# Patient Record
Sex: MALE | Race: WHITE | NOT HISPANIC OR LATINO | Employment: FULL TIME | URBAN - METROPOLITAN AREA
[De-identification: names, ages, dates, MRNs, and addresses within clinical notes are randomized per-mention and may not be internally consistent; named-entity substitution may affect disease eponyms.]

---

## 2017-01-31 ENCOUNTER — HOSPITAL ENCOUNTER (OUTPATIENT)
Facility: MEDICAL CENTER | Age: 27
End: 2017-01-31
Attending: ORTHOPAEDIC SURGERY | Admitting: ORTHOPAEDIC SURGERY
Payer: MEDICAID

## 2017-01-31 ENCOUNTER — APPOINTMENT (OUTPATIENT)
Dept: RADIOLOGY | Facility: MEDICAL CENTER | Age: 27
End: 2017-01-31
Attending: ORTHOPAEDIC SURGERY
Payer: MEDICAID

## 2017-01-31 VITALS
WEIGHT: 175 LBS | BODY MASS INDEX: 23.7 KG/M2 | RESPIRATION RATE: 16 BRPM | HEART RATE: 96 BPM | HEIGHT: 72 IN | TEMPERATURE: 97.9 F | OXYGEN SATURATION: 98 %

## 2017-01-31 PROBLEM — S62.022A: Status: ACTIVE | Noted: 2017-01-31

## 2017-01-31 PROCEDURE — 700111 HCHG RX REV CODE 636 W/ 250 OVERRIDE (IP)

## 2017-01-31 PROCEDURE — 501838 HCHG SUTURE GENERAL: Performed by: ORTHOPAEDIC SURGERY

## 2017-01-31 PROCEDURE — A6222 GAUZE <=16 IN NO W/SAL W/O B: HCPCS | Performed by: ORTHOPAEDIC SURGERY

## 2017-01-31 PROCEDURE — 160048 HCHG OR STATISTICAL LEVEL 1-5: Performed by: ORTHOPAEDIC SURGERY

## 2017-01-31 PROCEDURE — 110382 HCHG SHELL REV 271: Performed by: ORTHOPAEDIC SURGERY

## 2017-01-31 PROCEDURE — 160002 HCHG RECOVERY MINUTES (STAT): Performed by: ORTHOPAEDIC SURGERY

## 2017-01-31 PROCEDURE — 700102 HCHG RX REV CODE 250 W/ 637 OVERRIDE(OP): Performed by: ORTHOPAEDIC SURGERY

## 2017-01-31 PROCEDURE — 502576 HCHG PACK, HAND: Performed by: ORTHOPAEDIC SURGERY

## 2017-01-31 PROCEDURE — 700102 HCHG RX REV CODE 250 W/ 637 OVERRIDE(OP)

## 2017-01-31 PROCEDURE — 501480 HCHG STOCKINETTE: Performed by: ORTHOPAEDIC SURGERY

## 2017-01-31 PROCEDURE — 160046 HCHG PACU - 1ST 60 MINS PHASE II: Performed by: ORTHOPAEDIC SURGERY

## 2017-01-31 PROCEDURE — A9270 NON-COVERED ITEM OR SERVICE: HCPCS

## 2017-01-31 PROCEDURE — 302135 SEQUENTIAL COMPRESSION MACHINE: Performed by: ORTHOPAEDIC SURGERY

## 2017-01-31 PROCEDURE — A9270 NON-COVERED ITEM OR SERVICE: HCPCS | Performed by: ORTHOPAEDIC SURGERY

## 2017-01-31 PROCEDURE — A4606 OXYGEN PROBE USED W OXIMETER: HCPCS | Performed by: ORTHOPAEDIC SURGERY

## 2017-01-31 PROCEDURE — 110371 HCHG SHELL REV 272: Performed by: ORTHOPAEDIC SURGERY

## 2017-01-31 PROCEDURE — 160039 HCHG SURGERY MINUTES - EA ADDL 1 MIN LEVEL 3: Performed by: ORTHOPAEDIC SURGERY

## 2017-01-31 PROCEDURE — 160028 HCHG SURGERY MINUTES - 1ST 30 MINS LEVEL 3: Performed by: ORTHOPAEDIC SURGERY

## 2017-01-31 PROCEDURE — 160009 HCHG ANES TIME/MIN: Performed by: ORTHOPAEDIC SURGERY

## 2017-01-31 PROCEDURE — 700101 HCHG RX REV CODE 250

## 2017-01-31 PROCEDURE — 502000 HCHG MISC OR IMPLANTS RC 0278: Performed by: ORTHOPAEDIC SURGERY

## 2017-01-31 PROCEDURE — 160025 RECOVERY II MINUTES (STATS): Performed by: ORTHOPAEDIC SURGERY

## 2017-01-31 PROCEDURE — 501736 HCHG WIRE, K-5M DBL END: Performed by: ORTHOPAEDIC SURGERY

## 2017-01-31 PROCEDURE — 160047 HCHG PACU  - EA ADDL 30 MINS PHASE II: Performed by: ORTHOPAEDIC SURGERY

## 2017-01-31 PROCEDURE — 700111 HCHG RX REV CODE 636 W/ 250 OVERRIDE (IP): Performed by: ORTHOPAEDIC SURGERY

## 2017-01-31 PROCEDURE — 502240 HCHG MISC OR SUPPLY RC 0272: Performed by: ORTHOPAEDIC SURGERY

## 2017-01-31 PROCEDURE — 160036 HCHG PACU - EA ADDL 30 MINS PHASE I: Performed by: ORTHOPAEDIC SURGERY

## 2017-01-31 PROCEDURE — 160035 HCHG PACU - 1ST 60 MINS PHASE I: Performed by: ORTHOPAEDIC SURGERY

## 2017-01-31 DEVICE — WIRE K- SMOOTH .045 - (3TX6=18): Type: IMPLANTABLE DEVICE | Status: FUNCTIONAL

## 2017-01-31 DEVICE — IMPLANTABLE DEVICE: Type: IMPLANTABLE DEVICE | Status: FUNCTIONAL

## 2017-01-31 RX ORDER — DIPHENHYDRAMINE HYDROCHLORIDE 50 MG/ML
25 INJECTION INTRAMUSCULAR; INTRAVENOUS EVERY 6 HOURS PRN
Status: DISCONTINUED | OUTPATIENT
Start: 2017-01-31 | End: 2017-01-31 | Stop reason: HOSPADM

## 2017-01-31 RX ORDER — IMIPRAMINE HYDROCHLORIDE 10 MG/1
10 TABLET, FILM COATED ORAL NIGHTLY
COMMUNITY
End: 2018-12-11

## 2017-01-31 RX ORDER — CLONIDINE HYDROCHLORIDE 0.1 MG/1
0.1 TABLET ORAL
COMMUNITY
End: 2018-12-11

## 2017-01-31 RX ORDER — OXYCODONE HYDROCHLORIDE 10 MG/1
10 TABLET ORAL
Status: DISCONTINUED | OUTPATIENT
Start: 2017-01-31 | End: 2017-01-31 | Stop reason: HOSPADM

## 2017-01-31 RX ORDER — DEXAMETHASONE SODIUM PHOSPHATE 4 MG/ML
4 INJECTION, SOLUTION INTRA-ARTICULAR; INTRALESIONAL; INTRAMUSCULAR; INTRAVENOUS; SOFT TISSUE
Status: DISCONTINUED | OUTPATIENT
Start: 2017-01-31 | End: 2017-01-31 | Stop reason: HOSPADM

## 2017-01-31 RX ORDER — SODIUM CHLORIDE, SODIUM LACTATE, POTASSIUM CHLORIDE, CALCIUM CHLORIDE 600; 310; 30; 20 MG/100ML; MG/100ML; MG/100ML; MG/100ML
1000 INJECTION, SOLUTION INTRAVENOUS
Status: DISCONTINUED | OUTPATIENT
Start: 2017-01-31 | End: 2017-01-31 | Stop reason: HOSPADM

## 2017-01-31 RX ORDER — HALOPERIDOL 5 MG/ML
1 INJECTION INTRAMUSCULAR EVERY 6 HOURS PRN
Status: DISCONTINUED | OUTPATIENT
Start: 2017-01-31 | End: 2017-01-31 | Stop reason: HOSPADM

## 2017-01-31 RX ORDER — OXYCODONE HYDROCHLORIDE AND ACETAMINOPHEN 5; 325 MG/1; MG/1
TABLET ORAL
Status: COMPLETED
Start: 2017-01-31 | End: 2017-01-31

## 2017-01-31 RX ORDER — ONDANSETRON 2 MG/ML
4 INJECTION INTRAMUSCULAR; INTRAVENOUS EVERY 4 HOURS PRN
Status: DISCONTINUED | OUTPATIENT
Start: 2017-01-31 | End: 2017-01-31 | Stop reason: HOSPADM

## 2017-01-31 RX ORDER — BUPIVACAINE HYDROCHLORIDE 5 MG/ML
INJECTION, SOLUTION EPIDURAL; INTRACAUDAL
Status: DISCONTINUED | OUTPATIENT
Start: 2017-01-31 | End: 2017-01-31 | Stop reason: HOSPADM

## 2017-01-31 RX ADMIN — FENTANYL CITRATE 25 MCG: 50 INJECTION, SOLUTION INTRAMUSCULAR; INTRAVENOUS at 17:45

## 2017-01-31 RX ADMIN — FENTANYL CITRATE 25 MCG: 50 INJECTION, SOLUTION INTRAMUSCULAR; INTRAVENOUS at 17:30

## 2017-01-31 RX ADMIN — FENTANYL CITRATE 25 MCG: 50 INJECTION, SOLUTION INTRAMUSCULAR; INTRAVENOUS at 18:00

## 2017-01-31 RX ADMIN — FENTANYL CITRATE 25 MCG: 50 INJECTION, SOLUTION INTRAMUSCULAR; INTRAVENOUS at 17:35

## 2017-01-31 RX ADMIN — OXYCODONE HYDROCHLORIDE AND ACETAMINOPHEN 2 TABLET: 5; 325 TABLET ORAL at 17:45

## 2017-01-31 RX ADMIN — HYDROMORPHONE HYDROCHLORIDE 0.2 MG: 1 INJECTION, SOLUTION INTRAMUSCULAR; INTRAVENOUS; SUBCUTANEOUS at 17:35

## 2017-01-31 ASSESSMENT — PAIN SCALES - GENERAL
PAINLEVEL_OUTOF10: 10
PAINLEVEL_OUTOF10: 8
PAINLEVEL_OUTOF10: 10
PAINLEVEL_OUTOF10: 6
PAINLEVEL_OUTOF10: 5
PAINLEVEL_OUTOF10: 8
PAINLEVEL_OUTOF10: 0
PAINLEVEL_OUTOF10: 5

## 2017-01-31 NOTE — IP AVS SNAPSHOT
1/31/2017          Irving Alaniz  [address] [place] [address]    Dear Irving:    [facility] wants to ensure your discharge home is safe and you or your loved ones have had all your questions answered regarding your care after you leave the hospital.    You may receive a telephone call within two days of your discharge.  This call is to make certain you understand your discharge instructions as well as ensure we provided you with the best care possible during your stay with us.     The call will only last approximately 3-5 minutes and will be done by a nurse.    Once again, we want to ensure your discharge home is safe and that you have a clear understanding of any next steps in your care.  If you have any questions or concerns, please do not hesitate to contact us, we are here for you.  Thank you for choosing [facility] for your healthcare needs.    Sincerely,    Salbador Messer    [facility]

## 2017-01-31 NOTE — OR SURGEON
Immediate Post-Operative Note      PreOp Diagnosis: L scaphoid waist fracture    PostOp Diagnosis: same    Procedure(s):  WRIST ORIF - SCAPHOID left    Surgeon(s):  Jeff Campos M.D.    Anesthesiologist/Type of Anesthesia:  Anesthesiologist: Amari Valerio D.O./* No anesthesia type entered *    Surgical Staff:  Circulator: Alis Norton R.N.  Scrub Person: Evaristo Judd    Specimen: none    Estimated Blood Loss: min    Findings: comminuted fracture    Complications: none        1/31/2017 3:47 PM Jeff Campos

## 2017-01-31 NOTE — IP AVS SNAPSHOT
After Visit Summary                                                                                                                Name:Irving Alaniz  Medical Record Number:[id number]  CSN: [id number]    YOB: 1990   Age: 26 y.o.  Sex: male  HT:1.829 m (6') WT: 79.379 kg (175 lb)          Admit Date: 1/31/2017     Discharge Date:   Today's Date: 1/31/2017  Attending Doctor:  Jeff Campos M.D.                  Allergies:  Review of patient's allergies indicates not on file.              Follow-up Information     1. Follow up with Jeff Campos M.D..    Specialty:  Orthopaedics    Why:  10-14 days    Contact information    [address]  [address]  [facility] [address]  [phone]          Discharge Instructions         ACTIVITY: Rest and take it easy for the first 24 hours.  A responsible adult is recommended to remain with you during that time.  It is normal to feel sleepy.  We encourage you to not do anything that requires balance, judgment or coordination.    MILD FLU-LIKE SYMPTOMS ARE NORMAL. YOU MAY EXPERIENCE GENERALIZED MUSCLE ACHES, THROAT IRRITATION, HEADACHE AND/OR SOME NAUSEA.    FOR 24 HOURS DO NOT:  Drive, operate machinery or run household appliances.  Drink beer or alcoholic beverages.   Make important decisions or sign legal documents.    SPECIAL INSTRUCTIONS: move all fingers frequently.  Non weight bearing to Left hand.  No lifting ,pulling, or pushing using the left hand.  Keep hand elevated often  And apply ice frequently.    DIET: To avoid nausea, slowly advance diet as tolerated, avoiding spicy or greasy foods for the first day.  Add more substantial food to your diet according to your physician's instructions.  Babies can be fed formula or breast milk as soon as they are hungry.  INCREASE FLUIDS AND FIBER TO AVOID CONSTIPATION.    SURGICAL DRESSING/BATHING: Keep dressing clean and dry, may shower on post op day #1 with dressings covered.  Leave dressing in place until follow up  visit.    FOLLOW-UP APPOINTMENT:  A follow-up appointment should be arranged with your doctor ; call to schedule.    You should CALL YOUR PHYSICIAN if you develop:  Fever greater than 101 degrees F.  Pain not relieved by medication, or persistent nausea or vomiting.  Excessive bleeding (blood soaking through dressing) or unexpected drainage from the wound.  Extreme redness or swelling around the incision site, drainage of pus or foul smelling drainage.  Inability to urinate or empty your bladder within 8 hours.  Problems with breathing or chest pain.    You should call 911 if you develop problems with breathing or chest pain.  If you are unable to contact your doctor or surgical center, you should go to the nearest emergency room or urgent care center.  Physician's telephone #: [phone]    If any questions arise, call your doctor.  If your doctor is not available, please feel free to call the Surgical Center at {Surgical Dept Numbers:[id number]}.  The Center is open Monday through Friday from 7AM to 7PM.  You can also call the [facility] HOTLINE open 24 hours/day, 7 days/week and speak to a nurse at [phone], or toll free at [phone].    A registered nurse may call you a few days after your surgery to see how you are doing after your procedure.    MEDICATIONS: Resume taking daily medication.  Take prescribed pain medication with food.  If no medication is prescribed, you may take non-aspirin pain medication if needed.  PAIN MEDICATION CAN BE VERY CONSTIPATING.  Take a stool softener or laxative such as senokot, pericolace, or milk of magnesia if needed.    Prescription given for Oxycodone.  Last pain medication given at 5:35pm.    If your physician has prescribed pain medication that includes Acetaminophen (Tylenol), do not take additional Acetaminophen (Tylenol) while taking the prescribed medication.    Depression / Suicide Risk    As you are discharged from this [facility] facility, it is important to  learn how to keep safe from harming yourself.    Recognize the warning signs:  · Abrupt changes in personality, positive or negative- including increase in energy   · Giving away possessions  · Change in eating patterns- significant weight changes-  positive or negative  · Change in sleeping patterns- unable to sleep or sleeping all the time   · Unwillingness or inability to communicate  · Depression  · Unusual sadness, discouragement and loneliness  · Talk of wanting to die  · Neglect of personal appearance   · Rebelliousness- reckless behavior  · Withdrawal from people/activities they love  · Confusion- inability to concentrate     If you or a loved one observes any of these behaviors or has concerns about self-harm, here's what you can do:  · Talk about it- your feelings and reasons for harming yourself  · Remove any means that you might use to hurt yourself (examples: pills, rope, extension cords, firearm)  · Get professional help from the community (Mental Health, Substance Abuse, psychological counseling)  · Do not be alone:Call your Safe Contact- someone whom you trust who will be there for you.  · Call your local CRISIS HOTLINE 854-0542 or 060-384-2004  · Call your local Children's Mobile Crisis Response Team [place] (434) 957-3292 or www.[employer]  · Call the toll free National Suicide Prevention Hotlines   · National Suicide Prevention Lifeline 999-653-BEFG (7410)  · National Hope Line Network 800-SUICIDE (463-1757)       Medication List      CONTINUE taking these medications        Instructions    clonidine 0.1 MG Tabs   Commonly known as:  CATAPRES    Take 0.1 mg by mouth Once PRN.   Dose:  0.1 mg       imipramine 10 MG Tabs   Commonly known as:  TOFRANIL    Take 10 mg by mouth every evening.   Dose:  10 mg               Medication Information     Above and/or attached are the medications your physician expects you to take upon discharge. Review all of your home medications and newly ordered  medications with your doctor and/or pharmacist. Follow medication instructions as directed by your doctor and/or pharmacist. Please keep your medication list with you and share with your physician. Update the information when medications are discontinued, doses are changed, or new medications (including over-the-counter products) are added; and carry medication information at all times in the event of emergency situations.        Resources     Quit Smoking / Tobacco Use:    I understand the use of any tobacco products increases my chance of suffering from future heart disease or stroke and could cause other illnesses which may shorten my life. Quitting the use of tobacco products is the single most important thing I can do to improve my health. For further information on smoking / tobacco cessation call a Toll Free Quit Line at 1-996.912.9576 (*National Cancer Amery) or 1-453.862.2157 (American Lung Association) or you can access the web based program at www.lungusa.org.    [place] Tobacco Users Help Line:  (530) 833-3860       Toll Free: 1-669.252.1914  Quit Tobacco Program [facility] (433)395-3514    Crisis Hotline:    [place] Crisis Hotline:  0-867-EHNRZOA or 1-995.181.5049    [place] Crisis Hotline:    1-727.540.9825 or 801-791-1415    Discharge Survey:   Thank you for choosing [facility]. We hope we did everything we could to make your hospital stay a pleasant one. You may be receiving a survey and we would appreciate your time and participation in answering the questions. Your input is very valuable to us in our efforts to improve our service to our patients and their families.            Signatures     My signature on this form indicates that:    1. I acknowledge receipt and understanding of these Home Care Instruction.  2. My questions regarding this information have been answered to my satisfaction.  3. I have formulated a plan with my discharge nurse to obtain my prescribed  medications for home.    __________________________________      __________________________________                   Patient Signature                                 Guardian/Responsible Adult Signature      __________________________________                 __________       ________                       Nurse Signature                                               Date                 Time

## 2017-02-01 NOTE — OR NURSING
1707-arrives PACU. Sleeping, unresponsive to verbal or tactile stim.  ETT in place with OA. O2 @ 10L NC via ETT. VSS.  L wrist elevated on pillows and ice pack applied.  + warm pink digits noted.    1720-pt spontaneously arouses, OETT d/cd pt tolerated well. O2 via NC @2L.  Pt c/o 10/10 pain to wrist pt medicated per MD orders.  IVFs infusing w/o infiltrate.  dsg dry and intact.  1745-pt tolerates sips of water w/o nausea. Pt medicated orally. Vss. Friend updated by phone.    1815-pt sleeping intermittently. Arouses to verbal stim. vss pt admits some relief of pain but still intolerable at present.  Pt remedicated per MD orders see MAR.    1830-pt arouses to verbal stim. States pain is tolerable at present.  Pt assisted to dress and into recliner chair.  Friend to chairside.  1900-pt tolerates po flds w/o n/v. vss d/c instructions to friend with good understanding.  Pt denies urge to void at present.  D/cd via wc.

## 2017-02-01 NOTE — DISCHARGE INSTRUCTIONS
ACTIVITY: Rest and take it easy for the first 24 hours.  A responsible adult is recommended to remain with you during that time.  It is normal to feel sleepy.  We encourage you to not do anything that requires balance, judgment or coordination.    MILD FLU-LIKE SYMPTOMS ARE NORMAL. YOU MAY EXPERIENCE GENERALIZED MUSCLE ACHES, THROAT IRRITATION, HEADACHE AND/OR SOME NAUSEA.    FOR 24 HOURS DO NOT:  Drive, operate machinery or run household appliances.  Drink beer or alcoholic beverages.   Make important decisions or sign legal documents.    SPECIAL INSTRUCTIONS: move all fingers frequently.  Non weight bearing to Left hand.  No lifting ,pulling, or pushing using the left hand.  Keep hand elevated often  And apply ice frequently.    DIET: To avoid nausea, slowly advance diet as tolerated, avoiding spicy or greasy foods for the first day.  Add more substantial food to your diet according to your physician's instructions.  Babies can be fed formula or breast milk as soon as they are hungry.  INCREASE FLUIDS AND FIBER TO AVOID CONSTIPATION.    SURGICAL DRESSING/BATHING: Keep dressing clean and dry, may shower on post op day #1 with dressings covered.  Leave dressing in place until follow up visit.    FOLLOW-UP APPOINTMENT:  A follow-up appointment should be arranged with your doctor ; call to schedule.    You should CALL YOUR PHYSICIAN if you develop:  Fever greater than 101 degrees F.  Pain not relieved by medication, or persistent nausea or vomiting.  Excessive bleeding (blood soaking through dressing) or unexpected drainage from the wound.  Extreme redness or swelling around the incision site, drainage of pus or foul smelling drainage.  Inability to urinate or empty your bladder within 8 hours.  Problems with breathing or chest pain.    You should call 911 if you develop problems with breathing or chest pain.  If you are unable to contact your doctor or surgical center, you should go to the nearest emergency room or  urgent care center.  Physician's telephone #: [phone]    If any questions arise, call your doctor.  If your doctor is not available, please feel free to call the Surgical Center at {Surgical Dept Numbers:[id number]}.  The Center is open Monday through Friday from 7AM to 7PM.  You can also call the HEALTH HOTLINE open 24 hours/day, 7 days/week and speak to a nurse at [phone], or toll free at [phone].    A registered nurse may call you a few days after your surgery to see how you are doing after your procedure.    MEDICATIONS: Resume taking daily medication.  Take prescribed pain medication with food.  If no medication is prescribed, you may take non-aspirin pain medication if needed.  PAIN MEDICATION CAN BE VERY CONSTIPATING.  Take a stool softener or laxative such as senokot, pericolace, or milk of magnesia if needed.    Prescription given for Oxycodone.  Last pain medication given at 5:35pm.    If your physician has prescribed pain medication that includes Acetaminophen (Tylenol), do not take additional Acetaminophen (Tylenol) while taking the prescribed medication.    Depression / Suicide Risk    As you are discharged from this [facility] facility, it is important to learn how to keep safe from harming yourself.    Recognize the warning signs:  · Abrupt changes in personality, positive or negative- including increase in energy   · Giving away possessions  · Change in eating patterns- significant weight changes-  positive or negative  · Change in sleeping patterns- unable to sleep or sleeping all the time   · Unwillingness or inability to communicate  · Depression  · Unusual sadness, discouragement and loneliness  · Talk of wanting to die  · Neglect of personal appearance   · Rebelliousness- reckless behavior  · Withdrawal from people/activities they love  · Confusion- inability to concentrate     If you or a loved one observes any of these behaviors or has concerns about self-harm, here's what you  can do:  · Talk about it- your feelings and reasons for harming yourself  · Remove any means that you might use to hurt yourself (examples: pills, rope, extension cords, firearm)  · Get professional help from the community (Mental Health, Substance Abuse, psychological counseling)  · Do not be alone:Call your Safe Contact- someone whom you trust who will be there for you.  · Call your local CRISIS HOTLINE 560-8126 or 061-164-3176  · Call your local Children's Mobile Crisis Response Team [place] (719) 482-3690 or www.[employer]  · Call the toll free National Suicide Prevention Hotlines   · National Suicide Prevention Lifeline 558-389-YBCG (6882)  · National Hope Line Network 800-SUICIDE (966-6008)

## 2017-02-01 NOTE — OP REPORT
DATE OF SERVICE:  01/31/2017    PREOPERATIVE DIAGNOSIS:  Left comminuted scaphoid fracture.    POSTOPERATIVE DIAGNOSIS:  Left comminuted scaphoid fracture.    PROCEDURES:  1.  ORIF left comminuted scaphoid fracture.  2.  Bone graft, left scaphoid.    SURGEON:  Jeff Campos MD    ANESTHESIA:  General.    ESTIMATED BLOOD LOSS:  Minimal.    DRAINS:  None.    COMPLICATIONS:  None.    INDICATIONS:  The patient is a gentleman snowboarder sustained a scaphoid   fracture, felt to benefit from ORIF.  I explained the risks, benefits,   complications, and alternatives to the patient.  All questions were answered.    No guarantees were given.  Signed consent was obtained.    DESCRIPTION OF PROCEDURE:  The patient was taken to operating room and laid   supine on the table.  All bony prominences well padded.  Good general was   obtained without difficulty.  Left upper extremity was prepped and draped in   the usual sterile fashion using a ChloraPrep.  Limb was exsanguinated with   elevation, tourniquet raised, total tourniquet time was just about an hour.  I   made a volar incision hockey shaped over the scaphoid through skin and   subcutaneous tissues, retracted the FCR tendon, did a capsulotomy.  Open the   joint to evaluate the fracture.  There was a flipped comminuted.  There were   multiple comminuted fractures at the site.  I subsequently removed the soft   tissue from within the joint.  Under fluoroscopy, it looked like he had   already got some resorption of the defect.  Subsequently we went dorsally,   made an incision over Carmina's tubercle, elevated and harvested some bone   graft from the distal radius.  Once I did this, I closed the fascia with   Vicryl, subQ with Vicryl, skin with nylon.  I went back to the volar side,   irrigated and packed the bone graft within the defect, replaced the comminuted   fragments, flipped back and then reduced.  I then provisionally pinned the   scaphoid with a 0.045 K wire.  Then,  using the TriMed scaphoid screw system, I   placed a screw down the center portion of the scaphoid, reviewed under   multiple films under fluoroscopy, found to be in good position.  I then   measured, drilled a 22 mm long thread, TriMed scaphoid screw was then placed.    There was really good compression across the fracture site.  To help as a   second point of derotation, left 0.045 K wire was bent and impacted into   place.  I left it, twisted on the volar side, so would not irritate the   scaphotrapezium joint.  The construct was viewed under fluoroscopy, appeared   to be a reasonable reduction of the articular surface.  Screw was in good   position, it moved as one unit.  The wound was irrigated.  I closed the deep   fascia under Vicryl, subQ fascia over the FCR with Vicryl, subQ with Vicryl,   skin with nylon.  The dorsal wound was already closed.  Local without   epinephrine was injected.  Sterile dressing of Xeroform, 4x4, Sof-Rol, and   thumb spica splint was applied.  All needle and sponge counts were correct.    The patient tolerated the procedure well and was transferred to recovery in   stable condition.       ____________________________________     MD AUREA WALLER / MATTHEW    DD:  01/31/2017 17:10:30  DT:  01/31/2017 19:58:01    D#:  [id number]  Job#:  [id number]

## 2018-10-29 ENCOUNTER — OFFICE VISIT (OUTPATIENT)
Dept: MEDICAL GROUP | Facility: MEDICAL CENTER | Age: 28
End: 2018-10-29
Attending: FAMILY MEDICINE
Payer: COMMERCIAL

## 2018-10-29 VITALS
HEIGHT: 73 IN | TEMPERATURE: 98.5 F | SYSTOLIC BLOOD PRESSURE: 118 MMHG | BODY MASS INDEX: 23.35 KG/M2 | RESPIRATION RATE: 16 BRPM | WEIGHT: 176.2 LBS | OXYGEN SATURATION: 95 % | HEART RATE: 72 BPM | DIASTOLIC BLOOD PRESSURE: 60 MMHG

## 2018-10-29 DIAGNOSIS — R53.83 FATIGUE, UNSPECIFIED TYPE: ICD-10-CM

## 2018-10-29 DIAGNOSIS — Z00.00 HEALTHCARE MAINTENANCE: ICD-10-CM

## 2018-10-29 DIAGNOSIS — F41.9 ANXIETY: ICD-10-CM

## 2018-10-29 PROCEDURE — 99203 OFFICE O/P NEW LOW 30 MIN: CPT | Performed by: FAMILY MEDICINE

## 2018-10-29 PROCEDURE — 99204 OFFICE O/P NEW MOD 45 MIN: CPT | Performed by: FAMILY MEDICINE

## 2018-10-29 RX ORDER — HYDROXYZINE 50 MG/1
50 TABLET, FILM COATED ORAL 3 TIMES DAILY PRN
Qty: 30 TAB | Refills: 0 | Status: SHIPPED | OUTPATIENT
Start: 2018-10-29 | End: 2019-06-11

## 2018-10-29 RX ORDER — BUPROPION HYDROCHLORIDE 150 MG/1
150 TABLET ORAL EVERY MORNING
Qty: 30 TAB | Refills: 6 | Status: SHIPPED | OUTPATIENT
Start: 2018-10-29 | End: 2018-10-29 | Stop reason: SDUPTHER

## 2018-10-29 RX ORDER — BUPROPION HYDROCHLORIDE 150 MG/1
150 TABLET ORAL EVERY MORNING
Qty: 30 TAB | Refills: 6 | Status: SHIPPED | OUTPATIENT
Start: 2018-10-29 | End: 2018-12-11

## 2018-10-29 ASSESSMENT — PATIENT HEALTH QUESTIONNAIRE - PHQ9: CLINICAL INTERPRETATION OF PHQ2 SCORE: 0

## 2018-10-30 NOTE — PROGRESS NOTES
Chief Complaint   Patient presents with   • Establish Care   • Anxiety         HISTORY OF THE PRESENT ILLNESS: Patient is a 27 y.o. male. This pleasant patient is here today to establish care and discuss the following problems      Anxiety  The patient presents today with a history of being started at age 7 on clonidine and imipramine for depression/anxiety.  Patient reports he continued on this medication continuously over nearly the past 20 years.  About 7-8 months ago patient tapered off of both of those medications because he found that he was having extreme difficulty getting up to attend his employment, which required him to get up at about 3:30 AM each day.  Patient reports he was able to get up more successfully early each morning.  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  Patient reports he is never been dismissed from a position before.  He had worked there for 18 months.  Since they were short handed he had worked the previous 34 days consistently without a day off.  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  He reports that he was having difficulty with his memory due to prolonged lack of days off.  Patient reports that occasionally since he was fired he has had some difficulty with his orientation finding his way back home after going out for a jog in his neighborhood as an example.  He denies suicidal ideation, blurred vision, altered hearing.  Patient reports he has increased anxiety since he was dismissed from his job and has been  unable to get himself in for several different job interviews due to high levels of anxiety    Fatigue  Patient reports a general sense of fatigue over particularly the past month.  He is  not reporting any hair loss or cold intolerance.  Patient does report that he has been told he snores loudly but is not certain whether he experiences apneic spells.  Patient himself does not recall awakening short of breath in the middle of the night.    Social history-single, currently unemployed  Allergies: Patient has no known allergies.    Current Outpatient Prescriptions Ordered in [facility]   Medication Sig Dispense Refill   • hydrOXYzine HCl (ATARAX) 50 MG Tab Take 1 Tab by mouth 3 times a day as needed for Itching. 30 Tab 0   • buPROPion (WELLBUTRIN XL) 150 MG XL tablet Take 1 Tab by mouth every morning. 30 Tab 6   • clonidine (CATAPRES) 0.1 MG Tab Take 0.1 mg by mouth Once PRN.     • imipramine (TOFRANIL) 10 MG Tab Take 10 mg by mouth every evening.       No current Epic-ordered facility-administered medications on file.        Past Medical History:   Diagnosis Date   • Anxiety 2018       Past Surgical History:   Procedure Laterality Date   • WRIST ORIF Left 1/31/2017    Procedure: WRIST ORIF - SCAPHOID;  Surgeon: Jeff Campos M.D.;  Location: SURGERY [facility];  Service:        Social History   Substance Use Topics   • Smoking status: Never Smoker   • Smokeless tobacco: Never Used   • Alcohol use Yes      Comment: occassionally       Family Status   Relation Status   • MGFa (Not Specified)   • Neg Hx (Not Specified)     Family History   Problem Relation Age of Onset   • Cancer Maternal Grandfather         lung   • Diabetes Neg Hx    • Heart Disease Neg Hx    • Stroke Neg Hx        Review of Systems   Constitutional: Negative for fever, chills, weight loss and malaise/fatigue.   HENT: Negative for ear pain, nosebleeds, congestion, sore throat and neck pain.    Eyes: Negative for blurred vision.   Respiratory: Negative for cough, sputum production, shortness of breath and wheezing.    Cardiovascular: Negative for chest pain, palpitations, orthopnea and leg swelling.   Gastrointestinal: Negative for  "heartburn, nausea, vomiting and abdominal pain.   Genitourinary: Negative for dysuria, urgency and frequency.   Musculoskeletal: Negative for myalgias, back pain and joint pain.   Skin: Negative for rash and itching.   Neurological: Negative for dizziness, tingling, tremors, sensory change, focal weakness and headaches.   Endo/Heme/Allergies: Does not bruise/bleed easily.   Psychiatric/Behavioral: Positive for depression, anxiety, and occasional memory loss        Exam: Blood pressure 118/60, pulse 72, temperature 36.9 °C (98.5 °F), temperature source Temporal, resp. rate 16, height 1.848 m (6' 0.75\"), weight 79.9 kg (176 lb 3.2 oz), SpO2 95 %.  General: Normal appearing. No distress.  HEENT: Normocephalic. Eyes conjunctiva clear lids without ptosis, pupils equal and reactive to light accommodation, ears normal shape and contour, canals are clear bilaterally, tympanic membranes are benign, nasal mucosa benign, oropharynx is without erythema, edema or exudates.   Neck: Supple without JVD or bruit. Thyroid is not enlarged.  Pulmonary: Clear to ausculation.  Normal effort. No rales, ronchi, or wheezing.  Cardiovascular: Regular rate and rhythm without murmur. Carotid and radial pulses are intact and equal bilaterally.  Abdomen: Soft, nontender, nondistended. Normal bowel sounds. Liver and spleen are not palpable  Neurologic: Intact light touch and strength bilaterally,normal speech, no tremor, normal gait.   Lymph: No cervical, supraclavicular or axillary lymph nodes are palpable  Skin: Warm and dry.  No obvious lesions.  Musculoskeletal: Normal gait. No extremity cyanosis, clubbing, or edema.  Psych: Normal mood and affect. Alert and oriented x3. Judgment and insight is normal.    Please note that this dictation was created using voice recognition software. I have made every reasonable attempt to correct obvious errors, but I expect that there are errors of grammar and possibly content that I did not discover before " finalizing the note.      Assessment/Plan  1. Anxiety  COMP METABOLIC PANEL    CBC WITH DIFFERENTIAL    REFERRAL TO PSYCHOLOGY   2. Fatigue, unspecified type  TSH   3. Healthcare maintenance  LIPID PROFILE     Plan: 1.  Obtain CMP, CBC, fasting lipids, TSH  2.  Referral to psychology-anxiety and depression  3.  Trial of bupropion  mg every morning  4.  Hydroxyzine 50 mg every 6 hours as needed severe anxiety episodes  5.  Follow-up with me in 3 weeks

## 2018-10-30 NOTE — ASSESSMENT & PLAN NOTE
The patient presents today with a history of being started at age 7 on clonidine and imipramine for depression/anxiety.  Patient reports he continued on this medication continuously over nearly the past 20 years.  About 7-8 months ago patient tapered off of both of those medications because he found that he was having extreme difficulty getting up to attend his employment, which required him to get up at about 3:30 AM each day.  Patient reports he was able to get up more successfully early each morning.  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  Patient reports he is never been dismissed from a position before.  He had worked there for 18 months.  Since they were short handed he had worked the previous 34 days consistently without a day off.  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  He reports that he was having difficulty with his memory due to prolonged lack of days off.  Patient reports that occasionally since he was fired he has had some difficulty with his orientation finding his way back home after going out for a jog in his neighborhood as an example.  He denies suicidal ideation, blurred vision, altered hearing

## 2018-10-30 NOTE — ASSESSMENT & PLAN NOTE
Patient reports a general sense of fatigue over particularly the past month.  He is not reporting any hair loss or cold intolerance.  Patient does report that he has been told he snores loudly but is not certain whether he experiences apneic spells.  Patient himself does not recall awakening short of breath in the middle of the night.

## 2018-11-05 ENCOUNTER — HOSPITAL ENCOUNTER (OUTPATIENT)
Dept: LAB | Facility: MEDICAL CENTER | Age: 28
End: 2018-11-05
Attending: FAMILY MEDICINE
Payer: COMMERCIAL

## 2018-11-05 DIAGNOSIS — R53.83 FATIGUE, UNSPECIFIED TYPE: ICD-10-CM

## 2018-11-05 DIAGNOSIS — F41.9 ANXIETY: ICD-10-CM

## 2018-11-05 DIAGNOSIS — Z00.00 HEALTHCARE MAINTENANCE: ICD-10-CM

## 2018-11-05 LAB
BASOPHILS # BLD AUTO: 0.8 % (ref 0–1.8)
BASOPHILS # BLD: 0.06 K/UL (ref 0–0.12)
EOSINOPHIL # BLD AUTO: 0.06 K/UL (ref 0–0.51)
EOSINOPHIL NFR BLD: 0.8 % (ref 0–6.9)
ERYTHROCYTE [DISTWIDTH] IN BLOOD BY AUTOMATED COUNT: 41.4 FL (ref 35.9–50)
HCT VFR BLD AUTO: 45.9 % (ref 42–52)
HGB BLD-MCNC: 15.1 G/DL (ref 14–18)
IMM GRANULOCYTES # BLD AUTO: 0.02 K/UL (ref 0–0.11)
IMM GRANULOCYTES NFR BLD AUTO: 0.3 % (ref 0–0.9)
LYMPHOCYTES # BLD AUTO: 2.39 K/UL (ref 1–4.8)
LYMPHOCYTES NFR BLD: 31.6 % (ref 22–41)
MCH RBC QN AUTO: 29.7 PG (ref 27–33)
MCHC RBC AUTO-ENTMCNC: 32.9 G/DL (ref 33.7–35.3)
MCV RBC AUTO: 90.2 FL (ref 81.4–97.8)
MONOCYTES # BLD AUTO: 0.48 K/UL (ref 0–0.85)
MONOCYTES NFR BLD AUTO: 6.3 % (ref 0–13.4)
NEUTROPHILS # BLD AUTO: 4.55 K/UL (ref 1.82–7.42)
NEUTROPHILS NFR BLD: 60.2 % (ref 44–72)
NRBC # BLD AUTO: 0 K/UL
NRBC BLD-RTO: 0 /100 WBC
PLATELET # BLD AUTO: 299 K/UL (ref 164–446)
PMV BLD AUTO: 10.4 FL (ref 9–12.9)
RBC # BLD AUTO: 5.09 M/UL (ref 4.7–6.1)
TSH SERPL DL<=0.005 MIU/L-ACNC: 1.56 UIU/ML (ref 0.38–5.33)
WBC # BLD AUTO: 7.6 K/UL (ref 4.8–10.8)

## 2018-11-05 PROCEDURE — 36415 COLL VENOUS BLD VENIPUNCTURE: CPT

## 2018-11-05 PROCEDURE — 80053 COMPREHEN METABOLIC PANEL: CPT

## 2018-11-05 PROCEDURE — 80061 LIPID PANEL: CPT

## 2018-11-05 PROCEDURE — 84443 ASSAY THYROID STIM HORMONE: CPT

## 2018-11-05 PROCEDURE — 85025 COMPLETE CBC W/AUTO DIFF WBC: CPT

## 2018-11-06 LAB
ALBUMIN SERPL BCP-MCNC: 4.8 G/DL (ref 3.2–4.9)
ALBUMIN/GLOB SERPL: 1.7 G/DL
ALP SERPL-CCNC: 33 U/L (ref 30–99)
ALT SERPL-CCNC: 23 U/L (ref 2–50)
ANION GAP SERPL CALC-SCNC: 8 MMOL/L (ref 0–11.9)
AST SERPL-CCNC: 18 U/L (ref 12–45)
BILIRUB SERPL-MCNC: 1.1 MG/DL (ref 0.1–1.5)
BUN SERPL-MCNC: 13 MG/DL (ref 8–22)
CALCIUM SERPL-MCNC: 10.5 MG/DL (ref 8.5–10.5)
CHLORIDE SERPL-SCNC: 103 MMOL/L (ref 96–112)
CHOLEST SERPL-MCNC: 189 MG/DL (ref 100–199)
CO2 SERPL-SCNC: 27 MMOL/L (ref 20–33)
CREAT SERPL-MCNC: 1.11 MG/DL (ref 0.5–1.4)
FASTING STATUS PATIENT QL REPORTED: NORMAL
GLOBULIN SER CALC-MCNC: 2.8 G/DL (ref 1.9–3.5)
GLUCOSE SERPL-MCNC: 86 MG/DL (ref 65–99)
HDLC SERPL-MCNC: 51 MG/DL
LDLC SERPL CALC-MCNC: 123 MG/DL
POTASSIUM SERPL-SCNC: 4.2 MMOL/L (ref 3.6–5.5)
PROT SERPL-MCNC: 7.6 G/DL (ref 6–8.2)
SODIUM SERPL-SCNC: 138 MMOL/L (ref 135–145)
TRIGL SERPL-MCNC: 75 MG/DL (ref 0–149)

## 2018-11-19 ENCOUNTER — OFFICE VISIT (OUTPATIENT)
Dept: MEDICAL GROUP | Facility: MEDICAL CENTER | Age: 28
End: 2018-11-19
Attending: FAMILY MEDICINE
Payer: COMMERCIAL

## 2018-11-19 VITALS
HEART RATE: 72 BPM | HEIGHT: 73 IN | SYSTOLIC BLOOD PRESSURE: 108 MMHG | TEMPERATURE: 98.5 F | WEIGHT: 175 LBS | BODY MASS INDEX: 23.19 KG/M2 | RESPIRATION RATE: 16 BRPM | DIASTOLIC BLOOD PRESSURE: 60 MMHG | OXYGEN SATURATION: 97 %

## 2018-11-19 DIAGNOSIS — F41.9 ANXIETY: ICD-10-CM

## 2018-11-19 PROCEDURE — 99213 OFFICE O/P EST LOW 20 MIN: CPT | Performed by: FAMILY MEDICINE

## 2018-11-19 NOTE — PROGRESS NOTES
"Subjective:      Irving Alaniz is a 27 y.o. male who presents with Anxiety            HPI1. Anxiety- notes mod decrease in anxiety, less racing thoughts. Sleep is fair, 8hrs. No opportunity to take any hydroxyzine so far.  Patient denies suicidal ideation or confusion.    ROS negative for chest pain, chest pressure, dyspnea       Objective:     /60 (BP Location: Left arm, Patient Position: Sitting, BP Cuff Size: Adult)   Pulse 72   Temp 36.9 °C (98.5 °F) (Temporal)   Resp 16   Ht 1.848 m (6' 0.76\")   Wt 79.4 kg (175 lb)   SpO2 97%   BMI 23.24 kg/m²      Physical Exam  Gen.- alert, cooperative, in no acute distress  Neck- midline trachea, thyroid not enlarged or tender,supple, no cervical adenopathy  Chest-clear to auscultation and percussion with normal breath sounds. No retractions. Chest wall nontender  Cardiac- regular rhythm and rate. No murmur, thrill, or heave  Psych-normal affect with good eye contact. Normal grooming. Oriented x4.            Assessment/Plan:     1. Anxiety      Plan: 1.  Patient is encouraged to go ahead and complete the psychology referral process to initiate an appointment  2.  Continue current dose of Wellbutrin 150 mg XL every morning  3.  Revisit with me in additional 3-4 weeks.  "

## 2018-11-20 ENCOUNTER — TELEPHONE (OUTPATIENT)
Dept: MEDICAL GROUP | Facility: MEDICAL CENTER | Age: 28
End: 2018-11-20

## 2018-11-20 NOTE — TELEPHONE ENCOUNTER
1. Name: Irving      Call Back Number: [phone]  Patient approves a detailed voicemail message: yes    2. [place] [employer] unemployment paperwork received from Irving requiring provider signature. Patient stated that he forgot to bring in the paperwork to his appt that he had with his PCP on Nov 19th.    3. Paperwork placed in MA folder/basket to process.

## 2018-12-11 ENCOUNTER — OFFICE VISIT (OUTPATIENT)
Dept: MEDICAL GROUP | Facility: MEDICAL CENTER | Age: 28
End: 2018-12-11
Attending: FAMILY MEDICINE
Payer: COMMERCIAL

## 2018-12-11 VITALS
BODY MASS INDEX: 23.86 KG/M2 | RESPIRATION RATE: 16 BRPM | WEIGHT: 180 LBS | OXYGEN SATURATION: 96 % | DIASTOLIC BLOOD PRESSURE: 82 MMHG | SYSTOLIC BLOOD PRESSURE: 122 MMHG | TEMPERATURE: 98.1 F | HEIGHT: 73 IN | HEART RATE: 80 BPM

## 2018-12-11 DIAGNOSIS — F33.1 MODERATE EPISODE OF RECURRENT MAJOR DEPRESSIVE DISORDER (HCC): ICD-10-CM

## 2018-12-11 PROCEDURE — 99213 OFFICE O/P EST LOW 20 MIN: CPT | Performed by: FAMILY MEDICINE

## 2018-12-11 RX ORDER — BUPROPION HYDROCHLORIDE 300 MG/1
300 TABLET ORAL EVERY MORNING
Qty: 30 TAB | Refills: 6 | Status: SHIPPED | OUTPATIENT
Start: 2018-12-11 | End: 2019-01-10

## 2018-12-11 ASSESSMENT — PAIN SCALES - GENERAL: PAINLEVEL: NO PAIN

## 2018-12-11 NOTE — PROGRESS NOTES
"Subjective:      Irving Alaniz is a 27 y.o. male who presents with Follow-Up            HPI 1.  Anxiety/depression-patient reports that he has had no significant anxiety recently.  Has not had any panic episodes that would prompt him to take a hydroxyzine tablet.  He has been compliant taking bupropion 150 mg XL.  Notes that some days, more often afternoon he just lacks energy and initiative and is somewhat hesitant about trying to start work.  Other days he reports feeling pretty much his normal self.  Is actively exercising which gives him a sense of well-being.  Is being social with friends which also is positive for his internal well-being.  Patient shared that both his parents are taking and have done well on Zoloft for mild to moderate depression.    ROS negative for hallucinations, confusion, tinnitus       Objective:     /82 (BP Location: Left arm, Patient Position: Sitting, BP Cuff Size: Adult)   Pulse 80   Temp 36.7 °C (98.1 °F) (Temporal)   Resp 16   Ht 1.848 m (6' 0.76\")   Wt 81.6 kg (180 lb)   SpO2 96%   BMI 23.91 kg/m²      Physical Exam  Gen.- alert, cooperative, in no acute distress  Neck- midline trachea, thyroid not enlarged or tender,supple, no cervical adenopathy  Chest-clear to auscultation and percussion with normal breath sounds. No retractions. Chest wall nontender  Cardiac- regular rhythm and rate. No murmur, thrill, or heave  Psych-normal affect with good eye contact. Normal grooming. Oriented x4.            Assessment/Plan:     1. Moderate episode of recurrent major depressive disorder (HCC)      Plan: 1.  After discussion we will increase Wellbutrin to 300 mg once daily with consideration to switching to Zoloft in the future if needed.  Washout interval was reviewed  2.  Patient has just started counseling with his first appointment yesterday next appointment in 1 week  "

## 2018-12-11 NOTE — LETTER
[facility], [facility]  Cornell Peralta M.D.  [address]  [place] [address]  Fax: [phone]   Authorization for Release/Disclosure of   Protected Health Information   Name: LONG MONTES : 1990 SSN: [id number]   [place] [facility] [address] Phone:    [phone] (home)    I authorize the entity listed below to release/disclose the PHI below to:   [facility], [facility]/Cornell Peralta M.D. and Cornell Peralta M.D.   Provider or Entity Name:     Address   City, State, Zip   Phone:      Fax:     Reason for request: continuity of care   Information to be released:    [  ] LAST COLONOSCOPY,  including any PATH REPORT and follow-up  [  ] LAST FIT/COLOGUARD RESULT [  ] LAST DEXA  [  ] LAST MAMMOGRAM  [  ] LAST PAP  [  ] LAST LABS [  ] RETINA EXAM REPORT  [  ] IMMUNIZATION RECORDS  [  ] Release all info      [  ] Check here and initial the line next to each item to release ALL health information INCLUDING  _____ Care and treatment for drug and / or alcohol abuse  _____ HIV testing, infection status, or AIDS  _____ Genetic Testing    DATES OF SERVICE OR TIME PERIOD TO BE DISCLOSED: _____________  I understand and acknowledge that:  * This Authorization may be revoked at any time by you in writing, except if your health information has already been used or disclosed.  * Your health information that will be used or disclosed as a result of you signing this authorization could be re-disclosed by the recipient. If this occurs, your re-disclosed health information may no longer be protected by State or Federal laws.  * You may refuse to sign this Authorization. Your refusal will not affect your ability to obtain treatment.  * This Authorization becomes effective upon signing and will  on (date) __________.      If no date is indicated, this Authorization will  one (1) year from the signature date.    Name: Long Montes    Signature:   Date:     2018       PLEASE FAX REQUESTED  RECORDS BACK TO: [phone]

## 2019-01-10 ENCOUNTER — OFFICE VISIT (OUTPATIENT)
Dept: MEDICAL GROUP | Facility: MEDICAL CENTER | Age: 29
End: 2019-01-10
Attending: FAMILY MEDICINE
Payer: COMMERCIAL

## 2019-01-10 VITALS
BODY MASS INDEX: 22.93 KG/M2 | HEART RATE: 84 BPM | DIASTOLIC BLOOD PRESSURE: 70 MMHG | TEMPERATURE: 99.6 F | OXYGEN SATURATION: 97 % | HEIGHT: 73 IN | RESPIRATION RATE: 16 BRPM | SYSTOLIC BLOOD PRESSURE: 124 MMHG | WEIGHT: 173 LBS

## 2019-01-10 DIAGNOSIS — F41.9 ANXIETY: ICD-10-CM

## 2019-01-10 DIAGNOSIS — F43.20 ADJUSTMENT DISORDER, UNSPECIFIED TYPE: ICD-10-CM

## 2019-01-10 PROCEDURE — 99213 OFFICE O/P EST LOW 20 MIN: CPT | Performed by: FAMILY MEDICINE

## 2019-01-11 NOTE — PROGRESS NOTES
"Subjective:      Irving Alaniz is a 28 y.o. male who presents with Depression            HPI 1.  Anxiety-patient reports no real improvement in feelings of decreased concentration, discontent, intermittent hopelessness, decreased interest and mood swings since increasing Wellbutrin from 150 mg to 300 mg once daily.  Starting Wellbutrin originally did help control severe feelings of anxiety.  He has been seeing a counselor but only once per month, 2 sessions so far.  There is concerned that he may have an adjustment disorder although patient reports he has been in between jobs a number of times.  He reports that both his parents have done well on Zoloft.  Patient has not felt ready yet to take on a new job and is not currently job hunting.    ROS negative for current sleeplessness, nausea, suicidal ideation       Objective:     /70   Pulse 84   Temp 37.6 °C (99.6 °F) (Temporal)   Resp 16   Ht 1.848 m (6' 0.76\")   Wt 78.5 kg (173 lb)   SpO2 97%   BMI 22.98 kg/m²      Physical Exam  Gen.- alert, cooperative, in no acute distress  Neck- midline trachea, thyroid not enlarged or tender,supple, no cervical adenopathy  Chest-clear to auscultation and percussion with normal breath sounds. No retractions. Chest wall nontender  Cardiac- regular rhythm and rate. No murmur, thrill, or heave  Psych-normal affect with good eye contact. Normal grooming. Oriented x4.            Assessment/Plan:     1. Anxiety      2. Adjustment disorder, unspecified type    Plan: 1.  3-day washout period where he will discontinue Wellbutrin 300 mg and then start Zoloft 50 mg nightly  2.  Patient will check and see if he can have his psychologist see him every 2 weeks rather than every 4 weeks  3.  Revisit with me in 4 weeks or sooner if needed    "

## 2019-02-07 ENCOUNTER — OFFICE VISIT (OUTPATIENT)
Dept: MEDICAL GROUP | Facility: MEDICAL CENTER | Age: 29
End: 2019-02-07
Attending: FAMILY MEDICINE
Payer: COMMERCIAL

## 2019-02-07 VITALS
DIASTOLIC BLOOD PRESSURE: 62 MMHG | TEMPERATURE: 98.7 F | HEIGHT: 73 IN | SYSTOLIC BLOOD PRESSURE: 108 MMHG | RESPIRATION RATE: 16 BRPM | OXYGEN SATURATION: 98 % | HEART RATE: 56 BPM | WEIGHT: 179 LBS | BODY MASS INDEX: 23.72 KG/M2

## 2019-02-07 DIAGNOSIS — F41.9 ANXIETY: ICD-10-CM

## 2019-02-07 PROCEDURE — 99213 OFFICE O/P EST LOW 20 MIN: CPT | Performed by: FAMILY MEDICINE

## 2019-02-07 RX ORDER — SERTRALINE HYDROCHLORIDE 100 MG/1
100 TABLET, FILM COATED ORAL DAILY
Qty: 30 TAB | Refills: 6 | Status: SHIPPED | OUTPATIENT
Start: 2019-02-07 | End: 2019-06-11 | Stop reason: SDUPTHER

## 2019-02-08 NOTE — PROGRESS NOTES
"Subjective:      Irving Alaniz is a 28 y.o. male who presents with Anxiety            HPI 1.  Anxiety-patient reports that during the 3-day washout period off Wellbutrin he started to feel much better emotionally and with regard to fatigue and apathy.  He continued to notice further improvement over the first few days of taking sertraline.  He reports that the level of improvement has regressed modestly but he still feels much improved on sertraline compared to when he was taking Wellbutrin.  Reports that he feels about 85% recovery back to his old level of mental fitness and optimism.  Reports sleeping normal fashion, 8 hours per night.    ROS negative for suicidal ideation, hallucinations, confusion       Objective:     /62 (BP Location: Left arm, Patient Position: Sitting, BP Cuff Size: Adult)   Pulse (!) 56   Temp 37.1 °C (98.7 °F) (Temporal)   Resp 16   Ht 1.848 m (6' 0.76\")   Wt 81.2 kg (179 lb)   SpO2 98%   BMI 23.77 kg/m²      Physical Exam  Gen.- alert, cooperative, in no acute distress  Neck- midline trachea, thyroid not enlarged or tender,supple, no cervical adenopathy  Chest-clear to auscultation and percussion with normal breath sounds. No retractions. Chest wall nontender  Cardiac- regular rhythm and rate. No murmur, thrill, or heave  Psych-normal affect with good eye contact. Normal grooming. Oriented x4.            Assessment/Plan:     1. Anxiety    Plan: 1.  Increase sertraline from 50 up to 100 mg daily  2.  Patient will forward paperwork to allow work release so that he can start to search for new employment  3.  Revisit in 1 month    "

## 2019-03-12 ENCOUNTER — OFFICE VISIT (OUTPATIENT)
Dept: MEDICAL GROUP | Facility: MEDICAL CENTER | Age: 29
End: 2019-03-12
Attending: FAMILY MEDICINE
Payer: COMMERCIAL

## 2019-03-12 VITALS
RESPIRATION RATE: 16 BRPM | HEART RATE: 60 BPM | HEIGHT: 73 IN | BODY MASS INDEX: 23.06 KG/M2 | WEIGHT: 174 LBS | TEMPERATURE: 99.3 F | OXYGEN SATURATION: 95 % | DIASTOLIC BLOOD PRESSURE: 68 MMHG | SYSTOLIC BLOOD PRESSURE: 112 MMHG

## 2019-03-12 DIAGNOSIS — F41.9 ANXIETY: ICD-10-CM

## 2019-03-12 PROCEDURE — 99213 OFFICE O/P EST LOW 20 MIN: CPT | Performed by: FAMILY MEDICINE

## 2019-03-12 NOTE — PROGRESS NOTES
"Subjective:      Irving Alaniz is a 28 y.o. male who presents with Anxiety            HPI 1.  Anxiety-patient feels like he is close to 100% recovered from the previous severe daily levels of anxiety and some feelings of sadness.  He has been released from therapy by his psychologist.  He is currently taking sertraline 100 mg each evening.  He is very active physically and has sent out several work request responses.    ROS negative for confusion, difficulty sleeping, suicidal ideation       Objective:     /68 (BP Location: Left arm, Patient Position: Sitting, BP Cuff Size: Adult)   Pulse 60   Temp 37.4 °C (99.3 °F) (Temporal)   Resp 16   Ht 1.848 m (6' 0.76\")   Wt 78.9 kg (174 lb)   SpO2 95%   BMI 23.11 kg/m²      Physical Exam  Gen.- alert, cooperative, in no acute distress  Neck- midline trachea, thyroid not enlarged or tender,supple, no cervical adenopathy  Chest-clear to auscultation and percussion with normal breath sounds. No retractions. Chest wall nontender  Cardiac- regular rhythm and rate. No murmur, thrill, or heave  Psych-normal affect with good eye contact. Normal grooming. Oriented x4.            Assessment/Plan:     1. Anxiety-improved    Plan: 1.  Continue sertraline 100 mg nightly  2.  Patient is encouraged to pursue active employment  3.  Revisit with me in 3 months      "

## 2019-06-11 ENCOUNTER — OFFICE VISIT (OUTPATIENT)
Dept: MEDICAL GROUP | Facility: MEDICAL CENTER | Age: 29
End: 2019-06-11
Attending: FAMILY MEDICINE
Payer: COMMERCIAL

## 2019-06-11 VITALS
RESPIRATION RATE: 16 BRPM | DIASTOLIC BLOOD PRESSURE: 70 MMHG | SYSTOLIC BLOOD PRESSURE: 114 MMHG | TEMPERATURE: 98.6 F | BODY MASS INDEX: 24.39 KG/M2 | WEIGHT: 184 LBS | HEIGHT: 73 IN | OXYGEN SATURATION: 95 % | HEART RATE: 68 BPM

## 2019-06-11 DIAGNOSIS — F41.9 ANXIETY: ICD-10-CM

## 2019-06-11 PROCEDURE — 99213 OFFICE O/P EST LOW 20 MIN: CPT | Performed by: FAMILY MEDICINE

## 2019-06-11 RX ORDER — SERTRALINE HYDROCHLORIDE 100 MG/1
100 TABLET, FILM COATED ORAL DAILY
Qty: 30 TAB | Refills: 6 | Status: SHIPPED | OUTPATIENT
Start: 2019-06-11 | End: 2020-05-28

## 2019-06-11 ASSESSMENT — PATIENT HEALTH QUESTIONNAIRE - PHQ9: CLINICAL INTERPRETATION OF PHQ2 SCORE: 0

## 2019-06-12 NOTE — PROGRESS NOTES
"Subjective:      Irving Alaniz is a 28 y.o. male who presents with Anxiety            HPI 1.  Anxiety-patient reports he has been feeling well in the past 3 months.  He has continued to take sertraline 100 mg.  He reports on rare occasion he will forget to take the medication for 1 day and he will feel very fatigued that day into the next 2 to 3 days then everything seems to be reset back to normal.  He denies any suicidal ideation or confusion     ROS negative for chest pain, sleep disorder, nausea       Objective:     /70 (BP Location: Left arm, Patient Position: Sitting, BP Cuff Size: Adult)   Pulse 68   Temp 37 °C (98.6 °F) (Temporal)   Resp 16   Ht 1.848 m (6' 0.76\")   Wt 83.5 kg (184 lb)   SpO2 95%   BMI 24.44 kg/m²      Physical Exam  Gen.- alert, cooperative, in no acute distress  Neck- midline trachea, thyroid not enlarged or tender,supple, no cervical adenopathy  Chest-clear to auscultation and percussion with normal breath sounds. No retractions. Chest wall nontender  Cardiac- regular rhythm and rate. No murmur, thrill, or heave  Psych-normal affect with good eye contact. Normal grooming. Oriented x4.            Assessment/Plan:     1. Anxiety    1.  Discussed possibly beginning a taper but patient would like to continue current dose of 100 mg for at least the next 3 months  2.  Patient is encouraged to pursue active employment  3.  Renewed sertraline with revisit in 3 months      "

## 2019-08-27 ENCOUNTER — OFFICE VISIT (OUTPATIENT)
Dept: MEDICAL GROUP | Facility: MEDICAL CENTER | Age: 29
End: 2019-08-27
Attending: FAMILY MEDICINE
Payer: COMMERCIAL

## 2019-08-27 VITALS
HEIGHT: 73 IN | TEMPERATURE: 98.2 F | OXYGEN SATURATION: 97 % | HEART RATE: 64 BPM | WEIGHT: 191 LBS | RESPIRATION RATE: 16 BRPM | BODY MASS INDEX: 25.31 KG/M2 | SYSTOLIC BLOOD PRESSURE: 102 MMHG | DIASTOLIC BLOOD PRESSURE: 62 MMHG

## 2019-08-27 DIAGNOSIS — F41.9 ANXIETY: ICD-10-CM

## 2019-08-27 PROCEDURE — 99213 OFFICE O/P EST LOW 20 MIN: CPT | Performed by: FAMILY MEDICINE

## 2019-08-27 PROCEDURE — 99212 OFFICE O/P EST SF 10 MIN: CPT | Performed by: FAMILY MEDICINE

## 2019-08-27 NOTE — PROGRESS NOTES
"Subjective:      Irving Alaniz is a 28 y.o. male who presents with No chief complaint on file.            HPI 1.  Anxiety-patient has continued to do well taking 100 mg of sertraline.  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  He reports he is sleeping solid 8 hours at night.  He reports that accidentally he was  from his medication and over 4 days with no medication he started to become moderately uncomfortable.  Within 2 days of restarting it the anxiety excesses resolved.    ROS negative for confusion, palpitations, chest pain       Objective:     /62 (BP Location: Left arm, Patient Position: Sitting, BP Cuff Size: Adult)   Pulse 64   Temp 36.8 °C (98.2 °F) (Temporal)   Resp 16   Ht 1.848 m (6' 0.76\")   Wt 86.6 kg (191 lb)   SpO2 97%   BMI 25.37 kg/m²      Physical Exam  Gen.- alert, cooperative, in no acute distress  Neck- midline trachea, thyroid not enlarged or tender,supple, no cervical adenopathy  Chest-clear to auscultation and percussion with normal breath sounds. No retractions. Chest wall nontender  Cardiac- regular rhythm and rate. No murmur, thrill, or heave  Psych-normal affect with good eye contact. Normal grooming. Oriented x4.            Assessment/Plan:     1. Anxiety    Plan: 1.  Re-visit in early December  2.  Continue sertraline 100 mg daily  "

## 2020-01-04 ENCOUNTER — HOSPITAL ENCOUNTER (EMERGENCY)
Facility: MEDICAL CENTER | Age: 30
End: 2020-01-04
Attending: EMERGENCY MEDICINE

## 2020-01-04 ENCOUNTER — OFFICE VISIT (OUTPATIENT)
Dept: URGENT CARE | Facility: PHYSICIAN GROUP | Age: 30
End: 2020-01-04

## 2020-01-04 VITALS
OXYGEN SATURATION: 100 % | TEMPERATURE: 98.6 F | BODY MASS INDEX: 27.55 KG/M2 | HEIGHT: 73 IN | HEART RATE: 65 BPM | RESPIRATION RATE: 14 BRPM | SYSTOLIC BLOOD PRESSURE: 114 MMHG | WEIGHT: 207.89 LBS | DIASTOLIC BLOOD PRESSURE: 69 MMHG

## 2020-01-04 VITALS
TEMPERATURE: 98.7 F | BODY MASS INDEX: 24.96 KG/M2 | WEIGHT: 205 LBS | HEIGHT: 76 IN | RESPIRATION RATE: 20 BRPM | OXYGEN SATURATION: 100 % | HEART RATE: 87 BPM | DIASTOLIC BLOOD PRESSURE: 70 MMHG | SYSTOLIC BLOOD PRESSURE: 100 MMHG

## 2020-01-04 DIAGNOSIS — H10.31 ACUTE BACTERIAL CONJUNCTIVITIS OF RIGHT EYE: ICD-10-CM

## 2020-01-04 DIAGNOSIS — H57.11 EYE PAIN, RIGHT: ICD-10-CM

## 2020-01-04 DIAGNOSIS — H11.421: ICD-10-CM

## 2020-01-04 DIAGNOSIS — H53.9 VISION CHANGES: ICD-10-CM

## 2020-01-04 PROCEDURE — 99283 EMERGENCY DEPT VISIT LOW MDM: CPT

## 2020-01-04 RX ORDER — CIPROFLOXACIN AND DEXAMETHASONE 3; 1 MG/ML; MG/ML
4 SUSPENSION/ DROPS AURICULAR (OTIC) 2 TIMES DAILY
Qty: 1 BOTTLE | Refills: 0 | Status: SHIPPED | OUTPATIENT
Start: 2020-01-04

## 2020-01-04 SDOH — HEALTH STABILITY: MENTAL HEALTH: HOW OFTEN DO YOU HAVE A DRINK CONTAINING ALCOHOL?: MONTHLY OR LESS

## 2020-01-04 NOTE — ED PROVIDER NOTES
ED Provider Note    Scribed for Darren Urban M.D. by Dari Magana. 1/4/2020  12:20 PM    Primary care provider: Cornell Peralta M.D.  Means of arrival: Walk in  History obtained from: Patient  History limited by: None    CHIEF COMPLAINT  Chief Complaint   Patient presents with   • Eye Pain     patient c/o right eye pain with swelling and redness since 12/25/19. patient reports he was around another person with pink eye.        HPI  Irving Alaniz is a 29 y.o. male who presents to the Emergency Department for worsening right eye pain that began 9 days ago. Patient describes his right eye as red, itchy, and with purulent discharge. Patient has taken Advil every 4 hours with minimal relief of symptoms. He reports associated sore throat, nasal congestion, and nasal discharge. He denies any associated left eye pain, left eye redness, cough, or fever. He notes that he was in contact with a friend several weeks ago who was diagnosed with pink eye. Patient reports that he was seen and evaluated at [facility] urgent [facility] earlier today, and they instructed him to come to the ED.     REVIEW OF SYSTEMS  Pertinent negatives include no  left eye pain, left eye redness, cough, or fever.   See HPI for further details.     PAST MEDICAL HISTORY   has a past medical history of Anxiety (2018).    SURGICAL HISTORY   has a past surgical history that includes wrist orif (Left, 1/31/2017).    SOCIAL HISTORY  Social History     Tobacco Use   • Smoking status: Never Smoker   • Smokeless tobacco: Never Used   Substance Use Topics   • Alcohol use: Yes     Frequency: Monthly or less     Comment: occassionally   • Drug use: Yes     Types: Inhaled     Comment: marijuana      Social History     Substance and Sexual Activity   Drug Use Yes   • Types: Inhaled    Comment: marijuana       FAMILY HISTORY  Family History   Problem Relation Age of Onset   • Cancer Maternal Grandfather         lung   • Diabetes Neg Hx    • Heart Disease Neg Hx    •  "Stroke Neg Hx        CURRENT MEDICATIONS  Home Medications     Reviewed by Rizwana Roach R.N. (Registered Nurse) on 01/04/20 at 1126  Med List Status: Complete   Medication Last Dose Status   sertraline (ZOLOFT) 100 MG Tab 1/3/2020 Active                ALLERGIES  No Known Allergies    PHYSICAL EXAM  VITAL SIGNS: /69   Pulse 65   Temp 37 °C (98.6 °F) (Oral)   Resp 14   Ht 1.854 m (6' 1\")   Wt 94.3 kg (207 lb 14.3 oz)   SpO2 100%   BMI 27.43 kg/m²   Constitutional: Well developed, Well nourished, No acute distress, Non-toxic appearance.   HENT: Mild erythema of posterior oropharynx with no exudate  Eyes: Right eye: scleral edema with signficant conjunctiva, and scleral injection, eye movements normal, no proptosis, 30% ptosis of right upper lid, no periorbital swelling or edema   Lymphatic: Right anterior tragus lymphadenopathy, submandibular lymphanopathy    COURSE & MEDICAL DECISION MAKING  Nursing notes, VS, PMSFHx reviewed in chart.    12:20 PM Patient seen and examined at bedside.  There is no evidence of septal cellulitis.  I have movements are good.  Does have some lid ptosis of the upper lid.  I believe he has a bacterial conjunctivitis.  I am given him Ciprodex to use over the next several days and he is to use it for 3 days beyond when his eyes begin to clear up.  Informed the patient that if his symptoms do not improve within several days to return to the ED. He will follow up with ophthalmology. Patient understands and agrees with discharge home.    The patient will return for new or worsening symptoms and is stable at the time of discharge.  Given referral to ophthalmology if he has no improvement by Monday or Tuesday      DISPOSITION:  Patient will be discharged home in stable condition.    FINAL IMPRESSION  1. Acute bacterial conjunctivitis of right eye          IDari (Scribe), am scribing for, and in the presence of, Darren Urban M.D..    Electronically signed by: Dari OSULLIVAN" Chino (Scribe), 1/4/2020    IDarren M.D. personally performed the services described in this documentation, as scribed by Dari Magana in my presence, and it is both accurate and complete.    E    The note accurately reflects work and decisions made by me.  Darren Urban  1/4/2020  12:37 PM

## 2020-01-04 NOTE — ED TRIAGE NOTES
".Irving Alaniz  .  Chief Complaint   Patient presents with   • Eye Pain     patient c/o right eye pain with swelling and redness since 12/25/19. patient reports he was around another person with pink eye.      Patient to triage with above complaint. .Blood Pressure: 114/69, Pulse: 65, Respiration: 14, Temperature: 37 °C (98.6 °F), Height: 185.4 cm (6' 1\"), Weight: 94.3 kg (207 lb 14.3 oz), Pulse Oximetry: 100 %    Patient to lobby and instructed to inform staff of any needs.  "

## 2020-01-04 NOTE — PROGRESS NOTES
"Subjective:      Irving Alaniz is a 29 y.o. male who presents with Eye Pain (R eye pink and swollen x1 week)            This is a new problem.  29-year-old otherwise healthy who does not use any contact lenses presenting for evaluation of right eye pain, swelling and discharge and redness for the past 9 days.  No other URI symptoms reported.  No recent injury.  Denies any fever chills.  He does have moderate eye pain and foreign body sensation in the right eye but also has noticed change in vision.      Review of Systems   All other systems reviewed and are negative.         Objective:     /70 (BP Location: Left arm, Patient Position: Sitting, BP Cuff Size: Adult)   Pulse 87   Temp 37.1 °C (98.7 °F) (Temporal)   Resp 20   Ht 1.93 m (6' 4\")   Wt 93 kg (205 lb)   SpO2 100%   BMI 24.95 kg/m²      Physical Exam  Constitutional:       General: He is not in acute distress.     Appearance: Normal appearance. He is not ill-appearing or toxic-appearing.   HENT:      Head:      Comments: Right side of the face seems slightly more edematous     Right Ear: Tympanic membrane, ear canal and external ear normal.      Left Ear: Tympanic membrane, ear canal and external ear normal.      Nose: Nose normal.      Mouth/Throat:      Pharynx: Oropharynx is clear.   Eyes:      General:         Right eye: Discharge present.         Left eye: No discharge.      Extraocular Movements: Extraocular movements intact.      Conjunctiva/sclera:      Right eye: Right conjunctiva is injected. Chemosis and exudate present. No hemorrhage.     Left eye: Left conjunctiva is not injected. No chemosis, exudate or hemorrhage.     Pupils: Pupils are equal, round, and reactive to light.      Comments: Left eye is 20/30  Right eyes 20/40  There is some edema noted in the right upper and lower eyelid which extends to the face making the face slightly look assymetric   Cardiovascular:      Rate and Rhythm: Normal rate and regular rhythm.      " Heart sounds: No murmur. No friction rub. No gallop.    Pulmonary:      Effort: Pulmonary effort is normal. No respiratory distress.      Breath sounds: No stridor. No wheezing, rhonchi or rales.   Skin:     General: Skin is warm.   Neurological:      Mental Status: He is alert and oriented to person, place, and time.      Cranial Nerves: Cranial nerves are intact.                 Assessment/Plan:     ASSESSMENT:PLAN:  1. Edema of conjunctiva, right    2. Eye pain, right    3. Vision changes    I am not convinced he has a plain conjunctivitis.  Significant conjunctival edema, also some eye changes and drainage noted with surrounding tissue edema in the upper and lower eyelid  Recommend further evaluation in the ED  Patient was advised to go to [facility] ED.  Patient does not have any insurance.  No charge

## 2020-10-04 DIAGNOSIS — F41.9 ANXIETY: ICD-10-CM

## 2020-10-05 RX ORDER — SERTRALINE HYDROCHLORIDE 100 MG/1
TABLET, FILM COATED ORAL
Qty: 90 TAB | Refills: 0 | Status: SHIPPED | OUTPATIENT
Start: 2020-10-05 | End: 2020-12-16 | Stop reason: SDUPTHER

## 2020-12-15 DIAGNOSIS — F41.9 ANXIETY: ICD-10-CM

## 2020-12-15 RX ORDER — SERTRALINE HYDROCHLORIDE 100 MG/1
100 TABLET, FILM COATED ORAL
Qty: 21 TAB | Refills: 0 | OUTPATIENT
Start: 2020-12-15

## 2020-12-15 NOTE — TELEPHONE ENCOUNTER
Received request via: Patient    Was the patient seen in the last year in this department? Yes    Does the patient have an active prescription (recently filled or refills available) for medication(s) requested? Yes, pt has left his medication in [place] and he is currently in [place] due to a family emergency and will not be back for 2 to 3 weeks.

## 2020-12-16 DIAGNOSIS — F41.9 ANXIETY: ICD-10-CM

## 2020-12-16 RX ORDER — SERTRALINE HYDROCHLORIDE 100 MG/1
100 TABLET, FILM COATED ORAL
Qty: 30 TAB | Refills: 0 | Status: SHIPPED | OUTPATIENT
Start: 2020-12-16 | End: 2021-05-11 | Stop reason: SDUPTHER

## 2021-05-11 ENCOUNTER — OFFICE VISIT (OUTPATIENT)
Dept: MEDICAL GROUP | Facility: MEDICAL CENTER | Age: 31
End: 2021-05-11
Attending: FAMILY MEDICINE

## 2021-05-11 VITALS
SYSTOLIC BLOOD PRESSURE: 112 MMHG | WEIGHT: 186 LBS | HEART RATE: 76 BPM | RESPIRATION RATE: 16 BRPM | DIASTOLIC BLOOD PRESSURE: 76 MMHG | HEIGHT: 73 IN | BODY MASS INDEX: 24.65 KG/M2 | OXYGEN SATURATION: 97 % | TEMPERATURE: 97.3 F

## 2021-05-11 DIAGNOSIS — F41.9 ANXIETY: ICD-10-CM

## 2021-05-11 PROCEDURE — 99213 OFFICE O/P EST LOW 20 MIN: CPT | Performed by: FAMILY MEDICINE

## 2021-05-11 PROCEDURE — 99212 OFFICE O/P EST SF 10 MIN: CPT | Performed by: FAMILY MEDICINE

## 2021-05-11 RX ORDER — SERTRALINE HYDROCHLORIDE 100 MG/1
100 TABLET, FILM COATED ORAL
Qty: 30 TABLET | Refills: 5 | Status: SHIPPED | OUTPATIENT
Start: 2021-05-11 | End: 2021-12-16

## 2021-05-11 NOTE — PROGRESS NOTES
"Subjective:      Irving Alaniz is a 30 y.o. male who presents with Anxiety            HPI 1.  Anxiety-patient reports that he feels that he is benefiting significantly from remaining on sertraline 100 mg daily.  He reports that it significantly decreases his agitation and allows him to think things through more clearly.  Not reporting any suicidal ideation or confusion.    ROS negative for headache, nausea, drowsiness       Objective:     /76   Pulse 76   Temp 36.3 °C (97.3 °F) (Temporal)   Resp 16   Ht 1.848 m (6' 0.76\")   Wt 84.4 kg (186 lb)   SpO2 97%   BMI 24.70 kg/m²      Physical Exam  Gen.- alert, cooperative, in no acute distress  Neck- midline trachea, thyroid not enlarged or tender,supple, no cervical adenopathy  Chest-clear to auscultation and percussion with normal breath sounds. No retractions. Chest wall nontender  Cardiac- regular rhythm and rate. No murmur, thrill, or heave  Psych-normal affect with good eye contact. Normal grooming. Oriented x4.            Assessment/Plan:        1. Anxiety    - sertraline (ZOLOFT) 100 MG Tab; Take 1 tablet by mouth every day.  Dispense: 30 tablet; Refill: 5  Plan: 1.  Sertraline 100 mg #30 refilled with 5 additional refills  2.  Follow-up visit with me in 6 months  "

## 2022-01-27 ENCOUNTER — OFFICE VISIT (OUTPATIENT)
Dept: MEDICAL GROUP | Facility: MEDICAL CENTER | Age: 32
End: 2022-01-27
Attending: FAMILY MEDICINE

## 2022-01-27 VITALS
WEIGHT: 171.9 LBS | DIASTOLIC BLOOD PRESSURE: 76 MMHG | SYSTOLIC BLOOD PRESSURE: 112 MMHG | HEART RATE: 76 BPM | BODY MASS INDEX: 22.78 KG/M2 | TEMPERATURE: 97.6 F | HEIGHT: 73 IN | RESPIRATION RATE: 16 BRPM | OXYGEN SATURATION: 97 %

## 2022-01-27 DIAGNOSIS — F41.9 ANXIETY: ICD-10-CM

## 2022-01-27 PROCEDURE — 99213 OFFICE O/P EST LOW 20 MIN: CPT | Performed by: FAMILY MEDICINE

## 2022-01-27 RX ORDER — SERTRALINE HYDROCHLORIDE 100 MG/1
100 TABLET, FILM COATED ORAL
Qty: 90 TABLET | Refills: 3 | Status: SHIPPED | OUTPATIENT
Start: 2022-01-27

## 2022-01-27 NOTE — PROGRESS NOTES
"Subjective     Irving Alaniz is a 31 y.o. male who presents with Anxiety            HPI 1.  Anxiety/depression-patient returns for follow-up.  Reports he continues to do well on sertraline.  Patient when he accidentally goes without it for 1 to 3 days he will become increasingly irritable.  Also reports some feelings of sadness associated with a misunderstanding where his father is not communicating with the patient.  Patient denies any suicidal ideation, confusion    ROS patient reports normal sleep.  He is working.  Negative for chest pain chest pressure           Objective     /76   Pulse 76   Temp 36.4 °C (97.6 °F) (Temporal)   Resp 16   Ht 1.848 m (6' 0.76\")   Wt 78 kg (171 lb 14.4 oz)   SpO2 97%   BMI 22.83 kg/m²      Physical Exam  Gen.- alert, cooperative, in no acute distress  Neck- midline trachea, thyroid not enlarged or tender,supple, no cervical adenopathy  Chest-clear to auscultation and percussion with normal breath sounds. No retractions. Chest wall nontender  Cardiac- regular rhythm and rate. No murmur, thrill, or heave                        Assessment & Plan        1. Anxiety    - sertraline (ZOLOFT) 100 MG Tab; Take 1 Tablet by mouth every day.  Dispense: 90 Tablet; Refill: 3    Plan: 1.  Renew sertraline 100 mg nightly #90, refills x3  2.  Follow-up within 1 year or sooner as needed           "

## (undated) DEVICE — MASK ANESTHESIA ADULT  - (100/CA)

## (undated) DEVICE — SODIUM CHL IRRIGATION 0.9% 1000ML (12EA/CA)

## (undated) DEVICE — K-WIRE

## (undated) DEVICE — HUMID-VENT HEAT AND MOISTURE EXCHANGE- (50/BX)

## (undated) DEVICE — SUCTION INSTRUMENT YANKAUER BULBOUS TIP W/O VENT (50EA/CA)

## (undated) DEVICE — WATER IRRIGATION STERILE 1000ML (12EA/CA)

## (undated) DEVICE — TOURNIQUET CUFF 18 X 3 ONE PORT DISP - STERILE (10/BX)

## (undated) DEVICE — GLOVE, LITE (PAIR)

## (undated) DEVICE — PACK UPPER EXTREMITY SM OR - (3/CA)

## (undated) DEVICE — SPONGE GAUZESTER 4 X 4 4PLY - (128PK/CA)

## (undated) DEVICE — LACTATED RINGERS INJ 1000 ML - (14EA/CA 60CA/PF)

## (undated) DEVICE — CANISTER SUCTION RIGID RED 1500CC (40EA/CA)

## (undated) DEVICE — GOWN WARMING STANDARD FLEX - (30/CA)

## (undated) DEVICE — KIT ROOM DECONTAMINATION

## (undated) DEVICE — HEAD HOLDER JUNIOR/ADULT

## (undated) DEVICE — SUTURE 3-0 VICRYL PLUS SH - 8X 18 INCH (12/BX)

## (undated) DEVICE — PADDING CAST 4 IN STERILE - 4 X 4 YDS (24/CA)

## (undated) DEVICE — PADDING CAST 4 IN X 4 YDS - SOF-ROLL (12RL/BG 6BG/CT)

## (undated) DEVICE — SPLINT PLASTER 4 IN  X 15 IN - (50/BX 12BX/CA)

## (undated) DEVICE — DRESSING XEROFORM 1X8 - (50/BX 4BX/CA)

## (undated) DEVICE — ELECTRODE DUAL RETURN W/ CORD - (50/PK)

## (undated) DEVICE — GLOVE BIOGEL INDICATOR SZ 8 SURGICAL PF LTX - (50/BX 4BX/CA)

## (undated) DEVICE — Device

## (undated) DEVICE — TUBE E-T HI-LO CUFF 7.5MM (10EA/PK)

## (undated) DEVICE — TUBE CONNECTING SUCTION - CLEAR PLASTIC STERILE 72 IN (50EA/CA)

## (undated) DEVICE — SUTURE GENERAL

## (undated) DEVICE — CHLORAPREP 26 ML APPLICATOR - ORANGE TINT(25/CA)

## (undated) DEVICE — KIT ANESTHESIA W/CIRCUIT & 3/LT BAG W/FILTER (20EA/CA)

## (undated) DEVICE — DRAPE C-ARM LARGE 41IN X 74 IN - (10/BX 2BX/CA)

## (undated) DEVICE — SUTURE 4-0 ETHILON PS-2 18 (12PK/BX)"

## (undated) DEVICE — ELECTRODE 850 FOAM ADHESIVE - HYDROGEL RADIOTRNSPRNT (50/PK)

## (undated) DEVICE — STOCKINET BIAS 4 IN STERILE - (20/CA)

## (undated) DEVICE — GLOVE BIOGEL SZ 7 SURGICAL PF LTX - (50PR/BX 4BX/CA)

## (undated) DEVICE — GLOVE BIOGEL SZ 6.5 SURGICAL PF LTX (50PR/BX 4BX/CA)

## (undated) DEVICE — PROTECTOR ULNA NERVE - (36PR/CA)

## (undated) DEVICE — GLOVE BIOGEL SZ 8 SURGICAL PF LTX - (50PR/BX 4BX/CA)

## (undated) DEVICE — SENSOR SPO2 NEO LNCS ADHESIVE (20/BX) SEE USER NOTES

## (undated) DEVICE — BAG, SPONGE COUNT 50600